# Patient Record
Sex: MALE | Race: WHITE | URBAN - METROPOLITAN AREA
[De-identification: names, ages, dates, MRNs, and addresses within clinical notes are randomized per-mention and may not be internally consistent; named-entity substitution may affect disease eponyms.]

---

## 2018-07-06 ENCOUNTER — APPOINTMENT (RX ONLY)
Dept: URBAN - METROPOLITAN AREA CLINIC 126 | Facility: CLINIC | Age: 43
Setting detail: DERMATOLOGY
End: 2018-07-06

## 2018-07-06 DIAGNOSIS — A63.0 ANOGENITAL (VENEREAL) WARTS: ICD-10-CM

## 2018-07-06 PROCEDURE — ? COUNSELING

## 2018-07-06 PROCEDURE — 17111 DESTRUCTION B9 LESIONS 15/>: CPT

## 2018-07-06 PROCEDURE — ? TREATMENT REGIMEN

## 2018-07-06 PROCEDURE — ? LIQUID NITROGEN

## 2018-07-06 PROCEDURE — ? PRESCRIPTION

## 2018-07-06 RX ORDER — IMIQUIMOD 50 MG/G
CREAM TOPICAL TIW
Qty: 2 | Refills: 3 | Status: ERX | COMMUNITY
Start: 2018-07-06

## 2018-07-06 RX ADMIN — IMIQUIMOD: 50 CREAM TOPICAL at 16:03

## 2018-07-06 ASSESSMENT — LOCATION ZONE DERM
LOCATION ZONE: TRUNK
LOCATION ZONE: GENITALIA
LOCATION ZONE: LEG

## 2018-07-06 ASSESSMENT — LOCATION SIMPLE DESCRIPTION DERM
LOCATION SIMPLE: GROIN
LOCATION SIMPLE: RIGHT THIGH
LOCATION SIMPLE: GENITALIA
LOCATION SIMPLE: LEFT THIGH

## 2018-07-06 ASSESSMENT — LOCATION DETAILED DESCRIPTION DERM
LOCATION DETAILED: RIGHT ANTERIOR PROXIMAL THIGH
LOCATION DETAILED: LEFT ANTERIOR PROXIMAL THIGH
LOCATION DETAILED: SUPRAPUBIC SKIN
LOCATION DETAILED: RIGHT INGUINAL CREASE
LOCATION DETAILED: GENITALIA

## 2018-07-06 NOTE — PROCEDURE: TREATMENT REGIMEN
Detail Level: Simple
Plan: Patient advised to notify sexual partners of STD status and need for female patients to have annual cervical cancer screening / Pap smear

## 2018-07-06 NOTE — HPI: GENITAL WARTS (CONDYLOMA ACCUMINATUM)
How Severe Are Your Genital Warts?.: moderate
Is This A New Presentation, Or A Follow-Up?: Warts
How Many Partners: 1

## 2018-07-06 NOTE — PROCEDURE: LIQUID NITROGEN
Detail Level: Detailed
Medical Necessity Clause: This procedure was medically necessary because the lesions that were treated were:
Medical Necessity Information: It is in your best interest to select a reason for this procedure from the list below. All of these items fulfill various CMS LCD requirements except the new and changing color options.
Include Z78.9 (Other Specified Conditions Influencing Health Status) As An Associated Diagnosis?: Yes
Post-Care Instructions: I reviewed with the patient in detail post-care instructions. Patient is to wear sunprotection, and avoid picking at any of the treated lesions. Pt may apply Vaseline to crusted or scabbing areas.
Render Post-Care Instructions In Note?: no
Consent: The patient's consent was obtained including but not limited to risks of crusting, scabbing, blistering, scarring, darker or lighter pigmentary change, recurrence, incomplete removal and infection.

## 2018-10-30 ENCOUNTER — APPOINTMENT (RX ONLY)
Dept: URBAN - METROPOLITAN AREA CLINIC 126 | Facility: CLINIC | Age: 43
Setting detail: DERMATOLOGY
End: 2018-10-30

## 2018-10-30 DIAGNOSIS — A63.0 ANOGENITAL (VENEREAL) WARTS: ICD-10-CM

## 2018-10-30 PROCEDURE — ? LIQUID NITROGEN GENITALS

## 2018-10-30 PROCEDURE — ? COUNSELING

## 2018-10-30 PROCEDURE — 54056 CRYOSURGERY PENIS LESION(S): CPT

## 2018-10-30 PROCEDURE — ? TREATMENT REGIMEN

## 2018-10-30 ASSESSMENT — LOCATION SIMPLE DESCRIPTION DERM
LOCATION SIMPLE: LEFT SCROTUM
LOCATION SIMPLE: LEFT SUPRAPUBIC REGION
LOCATION SIMPLE: LEFT PENIS
LOCATION SIMPLE: VENTRAL PENIS
LOCATION SIMPLE: RIGHT SCROTUM
LOCATION SIMPLE: SUPERIOR SCROTUM
LOCATION SIMPLE: RIGHT SUPRAPUBIC REGION
LOCATION SIMPLE: RIGHT INGUINAL CREASE

## 2018-10-30 ASSESSMENT — LOCATION DETAILED DESCRIPTION DERM
LOCATION DETAILED: RIGHT LATERAL BASE OF VENTRAL PENILE SHAFT
LOCATION DETAILED: RIGHT SUPRAPUBIC REGION
LOCATION DETAILED: BASE OF VENTRAL PENILE SHAFT
LOCATION DETAILED: LEFT LATERAL MID-VENTRAL PENILE SHAFT
LOCATION DETAILED: SUPERIOR MIDLINE SCROTUM
LOCATION DETAILED: RIGHT LATERAL MID-VENTRAL PENILE SHAFT
LOCATION DETAILED: RIGHT SUPERIOR MEDIAL SCROTUM
LOCATION DETAILED: RIGHT INFERIOR MEDIAL SCROTUM
LOCATION DETAILED: MID-VENTRAL PENILE SHAFT
LOCATION DETAILED: RIGHT SUPERIOR LATERAL SCROTUM
LOCATION DETAILED: LEFT PENIS BASE
LOCATION DETAILED: LEFT SUPERIOR LATERAL SCROTUM
LOCATION DETAILED: RIGHT INGUINAL CREASE
LOCATION DETAILED: RIGHT INFERIOR LATERAL SCROTUM
LOCATION DETAILED: RIGHT ANTERIOR SUPERIOR SCROTUM
LOCATION DETAILED: RIGHT LATERAL DISTAL VENTRAL PENILE SHAFT
LOCATION DETAILED: LEFT SUPRAPUBIC REGION

## 2018-10-30 ASSESSMENT — LOCATION ZONE DERM
LOCATION ZONE: TRUNK
LOCATION ZONE: SCROTUM
LOCATION ZONE: PENIS